# Patient Record
Sex: FEMALE | Race: WHITE
[De-identification: names, ages, dates, MRNs, and addresses within clinical notes are randomized per-mention and may not be internally consistent; named-entity substitution may affect disease eponyms.]

---

## 2018-11-14 ENCOUNTER — HOSPITAL ENCOUNTER (OUTPATIENT)
Dept: HOSPITAL 75 - LABNPT | Age: 38
End: 2018-11-14
Attending: OBSTETRICS & GYNECOLOGY
Payer: COMMERCIAL

## 2018-11-14 DIAGNOSIS — O28.8: Primary | ICD-10-CM

## 2018-11-14 LAB
CREAT UR-MCNC: 198 MG/DL (ref 30–125)
PROT UR-MCNC: 29 MG/DL (ref 6–12)

## 2018-11-14 PROCEDURE — 82570 ASSAY OF URINE CREATININE: CPT

## 2018-11-14 PROCEDURE — 84156 ASSAY OF PROTEIN URINE: CPT

## 2018-11-29 ENCOUNTER — HOSPITAL ENCOUNTER (OUTPATIENT)
Dept: HOSPITAL 75 - LABNPT | Age: 38
End: 2018-11-29
Attending: OBSTETRICS & GYNECOLOGY
Payer: COMMERCIAL

## 2018-11-29 DIAGNOSIS — O28.8: Primary | ICD-10-CM

## 2018-11-29 LAB
CREAT UR-MCNC: 195 MG/DL (ref 30–125)
PROT UR-MCNC: 25 MG/DL (ref 6–12)

## 2018-11-29 PROCEDURE — 84156 ASSAY OF PROTEIN URINE: CPT

## 2018-11-29 PROCEDURE — 82570 ASSAY OF URINE CREATININE: CPT

## 2018-12-06 ENCOUNTER — HOSPITAL ENCOUNTER (INPATIENT)
Dept: HOSPITAL 75 - LDRP | Age: 38
LOS: 3 days | Discharge: HOME | End: 2018-12-09
Attending: OBSTETRICS & GYNECOLOGY | Admitting: OBSTETRICS & GYNECOLOGY
Payer: COMMERCIAL

## 2018-12-06 VITALS — WEIGHT: 176.5 LBS | HEIGHT: 63 IN | BODY MASS INDEX: 31.27 KG/M2

## 2018-12-06 VITALS — SYSTOLIC BLOOD PRESSURE: 141 MMHG | DIASTOLIC BLOOD PRESSURE: 88 MMHG

## 2018-12-06 VITALS — SYSTOLIC BLOOD PRESSURE: 141 MMHG | DIASTOLIC BLOOD PRESSURE: 93 MMHG

## 2018-12-06 VITALS — SYSTOLIC BLOOD PRESSURE: 122 MMHG | DIASTOLIC BLOOD PRESSURE: 77 MMHG

## 2018-12-06 DIAGNOSIS — O99.820: ICD-10-CM

## 2018-12-06 DIAGNOSIS — O99.613: ICD-10-CM

## 2018-12-06 DIAGNOSIS — Z3A.36: ICD-10-CM

## 2018-12-06 DIAGNOSIS — K21.9: ICD-10-CM

## 2018-12-06 LAB
ALBUMIN SERPL-MCNC: 3.3 GM/DL (ref 3.2–4.5)
ALP SERPL-CCNC: 129 U/L (ref 40–136)
ALT SERPL-CCNC: 14 U/L (ref 0–55)
APTT PPP: YELLOW S
BACTERIA #/AREA URNS HPF: NEGATIVE /HPF
BASOPHILS # BLD AUTO: 0 10^3/UL (ref 0–0.1)
BASOPHILS NFR BLD AUTO: 0 % (ref 0–10)
BILIRUB SERPL-MCNC: 0.4 MG/DL (ref 0.1–1)
BILIRUB UR QL STRIP: NEGATIVE
BUN/CREAT SERPL: 7
CALCIUM SERPL-MCNC: 9.1 MG/DL (ref 8.5–10.1)
CHLORIDE SERPL-SCNC: 109 MMOL/L (ref 98–107)
CO2 SERPL-SCNC: 19 MMOL/L (ref 21–32)
CREAT SERPL-MCNC: 0.68 MG/DL (ref 0.6–1.3)
CREAT UR-MCNC: 73 MG/DL (ref 30–125)
EOSINOPHIL # BLD AUTO: 0.1 10^3/UL (ref 0–0.3)
EOSINOPHIL NFR BLD AUTO: 1 % (ref 0–10)
ERYTHROCYTE [DISTWIDTH] IN BLOOD BY AUTOMATED COUNT: 13.8 % (ref 10–14.5)
FIBRINOGEN PPP-MCNC: (no result) MG/DL
GFR SERPLBLD BASED ON 1.73 SQ M-ARVRAT: > 60 ML/MIN
GLUCOSE SERPL-MCNC: 69 MG/DL (ref 70–105)
GLUCOSE UR STRIP-MCNC: NEGATIVE MG/DL
HCT VFR BLD CALC: 31 % (ref 35–52)
HGB BLD-MCNC: 10 G/DL (ref 11.5–16)
KETONES UR QL STRIP: (no result)
LEUKOCYTE ESTERASE UR QL STRIP: NEGATIVE
LYMPHOCYTES # BLD AUTO: 1.6 X 10^3 (ref 1–4)
LYMPHOCYTES NFR BLD AUTO: 18 % (ref 12–44)
MANUAL DIFFERENTIAL PERFORMED BLD QL: NO
MCH RBC QN AUTO: 26 PG (ref 25–34)
MCHC RBC AUTO-ENTMCNC: 33 G/DL (ref 32–36)
MCV RBC AUTO: 79 FL (ref 80–99)
MONOCYTES # BLD AUTO: 0.8 X 10^3 (ref 0–1)
MONOCYTES NFR BLD AUTO: 9 % (ref 0–12)
NEUTROPHILS # BLD AUTO: 6.6 X 10^3 (ref 1.8–7.8)
NEUTROPHILS NFR BLD AUTO: 73 % (ref 42–75)
NITRITE UR QL STRIP: NEGATIVE
PH UR STRIP: 6 [PH] (ref 5–9)
PLATELET # BLD: 214 10^3/UL (ref 130–400)
PMV BLD AUTO: 12.8 FL (ref 7.4–10.4)
POTASSIUM SERPL-SCNC: 3.8 MMOL/L (ref 3.6–5)
PROT SERPL-MCNC: 6 GM/DL (ref 6.4–8.2)
PROT UR QL STRIP: (no result)
PROT UR-MCNC: 7 MG/DL (ref 6–12)
RBC # BLD AUTO: 3.86 10^6/UL (ref 4.35–5.85)
RBC #/AREA URNS HPF: (no result) /HPF
SODIUM SERPL-SCNC: 137 MMOL/L (ref 135–145)
SP GR UR STRIP: 1.01 (ref 1.02–1.02)
SQUAMOUS #/AREA URNS HPF: (no result) /HPF
URATE SERPL-MCNC: 4.6 MG/DL (ref 2.6–7.2)
UROBILINOGEN UR-MCNC: NORMAL MG/DL
WBC # BLD AUTO: 9 10^3/UL (ref 4.3–11)
WBC #/AREA URNS HPF: (no result) /HPF

## 2018-12-06 PROCEDURE — 84550 ASSAY OF BLOOD/URIC ACID: CPT

## 2018-12-06 PROCEDURE — 80053 COMPREHEN METABOLIC PANEL: CPT

## 2018-12-06 PROCEDURE — 84156 ASSAY OF PROTEIN URINE: CPT

## 2018-12-06 PROCEDURE — 82570 ASSAY OF URINE CREATININE: CPT

## 2018-12-06 PROCEDURE — 83615 LACTATE (LD) (LDH) ENZYME: CPT

## 2018-12-06 PROCEDURE — 81000 URINALYSIS NONAUTO W/SCOPE: CPT

## 2018-12-06 PROCEDURE — 85007 BL SMEAR W/DIFF WBC COUNT: CPT

## 2018-12-06 PROCEDURE — 86900 BLOOD TYPING SEROLOGIC ABO: CPT

## 2018-12-06 PROCEDURE — 85027 COMPLETE CBC AUTOMATED: CPT

## 2018-12-06 PROCEDURE — 86901 BLOOD TYPING SEROLOGIC RH(D): CPT

## 2018-12-06 PROCEDURE — 85025 COMPLETE CBC W/AUTO DIFF WBC: CPT

## 2018-12-06 PROCEDURE — 86850 RBC ANTIBODY SCREEN: CPT

## 2018-12-06 PROCEDURE — 36415 COLL VENOUS BLD VENIPUNCTURE: CPT

## 2018-12-06 RX ADMIN — SODIUM CHLORIDE, SODIUM LACTATE, POTASSIUM CHLORIDE, CALCIUM CHLORIDE, AND DEXTROSE MONOHYDRATE SCH MLS/HR: 600; 310; 30; 20; 5 INJECTION, SOLUTION INTRAVENOUS at 12:39

## 2018-12-06 RX ADMIN — WATER SCH MLS/HR: 1 INJECTION INTRAMUSCULAR; INTRAVENOUS; SUBCUTANEOUS at 17:59

## 2018-12-06 RX ADMIN — WATER SCH MLS/HR: 1 INJECTION INTRAMUSCULAR; INTRAVENOUS; SUBCUTANEOUS at 22:00

## 2018-12-06 RX ADMIN — SODIUM CHLORIDE, SODIUM LACTATE, POTASSIUM CHLORIDE, CALCIUM CHLORIDE, AND DEXTROSE MONOHYDRATE SCH MLS/HR: 600; 310; 30; 20; 5 INJECTION, SOLUTION INTRAVENOUS at 17:11

## 2018-12-06 RX ADMIN — SODIUM CHLORIDE, SODIUM LACTATE, POTASSIUM CHLORIDE, CALCIUM CHLORIDE, AND DEXTROSE MONOHYDRATE SCH MLS/HR: 600; 310; 30; 20; 5 INJECTION, SOLUTION INTRAVENOUS at 10:40

## 2018-12-06 RX ADMIN — BETAMETHASONE ACETATE AND BETAMETHASONE SODIUM PHOSPHATE SCH MG: 3; 3 INJECTION, SUSPENSION INTRA-ARTICULAR; INTRALESIONAL; INTRAMUSCULAR; SOFT TISSUE at 13:30

## 2018-12-06 NOTE — HISTORY & PHYSICAL
History and Physical


Date Seen by Provider:  Dec 6, 2018


Time Seen by Provider:  17:18


This patient is a 38-year-old G 3 P2 female currently at 36-4/7 weeks' 

gestation.  She was seen on this date and found to be having regular 

contractions.  Her GBS culture after 35 weeks gestation was positive.  Labor 

and delivery for evaluation and hydration.  In spite of hydration her 

contractions have persisted and seemed to have intensified she has demonstrated 

cervical change from 2 and thick in the clinic to almost 3 and very soft 

multiparous cervix.  The presenting part has dropped from the -3 station to -1.

  Membranes are intact.





Allergies are none





Medications are prenatal vitamins





Medical social and surgical histories are per the antepartum record





Lab work is as follows


Laboratory Tests








Test


 18


10:35 18


12:00 18


12:20 Range/Units


 


 


White Blood Count


 9.0 


 


 


 4.3-11.0


10^3/uL


 


Red Blood Count


 3.86 L


 


 


 4.35-5.85


10^6/uL


 


Hemoglobin 10.0 L   11.5-16.0  G/DL


 


Hematocrit 31 L   35-52  %


 


Mean Corpuscular Volume 79 L   80-99  FL


 


Mean Corpuscular Hemoglobin 26    25-34  PG


 


Mean Corpuscular Hemoglobin


Concent 33 


 


 


 32-36  G/DL





 


Red Cell Distribution Width 13.8    10.0-14.5  %


 


Platelet Count


 214 


 


 


 130-400


10^3/uL


 


Mean Platelet Volume 12.8 H   7.4-10.4  FL


 


Neutrophils (%) (Auto) 73    42-75  %


 


Lymphocytes (%) (Auto) 18    12-44  %


 


Monocytes (%) (Auto) 9    0-12  %


 


Eosinophils (%) (Auto) 1    0-10  %


 


Basophils (%) (Auto) 0    0-10  %


 


Neutrophils # (Auto) 6.6    1.8-7.8  X 10^3


 


Lymphocytes # (Auto) 1.6    1.0-4.0  X 10^3


 


Monocytes # (Auto) 0.8    0.0-1.0  X 10^3


 


Eosinophils # (Auto)


 0.1 


 


 


 0.0-0.3


10^3/uL


 


Basophils # (Auto)


 0.0 


 


 


 0.0-0.1


10^3/uL


 


Sodium Level 137    135-145  MMOL/L


 


Potassium Level 3.8    3.6-5.0  MMOL/L


 


Chloride Level 109 H     MMOL/L


 


Carbon Dioxide Level 19 L   21-32  MMOL/L


 


Anion Gap 9    5-14  MMOL/L


 


Blood Urea Nitrogen 5 L   7-18  MG/DL


 


Creatinine


 0.68 


 


 


 0.60-1.30


MG/DL


 


Estimat Glomerular Filtration


Rate > 60 


 


 


  





 


BUN/Creatinine Ratio 7     


 


Glucose Level 69 L     MG/DL


 


Uric Acid 4.6    2.6-7.2  MG/DL


 


Calcium Level 9.1    8.5-10.1  MG/DL


 


Corrected Calcium 9.7    8.5-10.1  MG/DL


 


Total Bilirubin 0.4    0.1-1.0  MG/DL


 


Aspartate Amino Transf


(AST/SGOT) 19 


 


 


 5-34  U/L





 


Alanine Aminotransferase


(ALT/SGPT) 14 


 


 


 0-55  U/L





 


Alkaline Phosphatase 129      U/L


 


Lactate Dehydrogenase 216    125-220  U/L


 


Total Protein 6.0 L   6.4-8.2  GM/DL


 


Albumin 3.3    3.2-4.5  GM/DL


 


Urine Color  YELLOW    


 


Urine Clarity  VERY CLOUDY H   


 


Urine pH  6   5-9  


 


Urine Specific Gravity  1.015 L  1.016-1.022  


 


Urine Protein  1+ H 7  6-12  MG/DL


 


Urine Glucose (UA)  NEGATIVE   NEGATIVE  


 


Urine Ketones  2+ H  NEGATIVE  


 


Urine Nitrite  NEGATIVE   NEGATIVE  


 


Urine Bilirubin  NEGATIVE   NEGATIVE  


 


Urine Urobilinogen  NORMAL   NORMAL  MG/DL


 


Urine Leukocyte Esterase  NEGATIVE   NEGATIVE  


 


Urine RBC (Auto)  NEGATIVE   NEGATIVE  


 


Urine RBC  RARE    /HPF


 


Urine WBC  NONE    /HPF


 


Urine Squamous Epithelial


Cells 


 5-10 


 


  /HPF





 


Urine Crystals  NONE    /LPF


 


Urine Bacteria  NEGATIVE    /HPF


 


Urine Casts  NONE    /LPF


 


Urine Mucus  NEGATIVE    /LPF


 


Urine Culture Indicated  NO    


 


Urine Creatinine   73    MG/DL


 


Urine Protein/Creatinine Ratio   0.10   








Fetal monitor shows contractions every 2-3 minutes.  These contractions lasting 

in excess of a minute.  Fetal heart rate pattern is normal and reassuring





HEENT exam is normal


Neck is supple no lymphadenopathy no thyromegaly


Abdomen is gravid soft nontender nondistended


Extremities show clubbing cyanosis.  There is no Homans sign.





Pelvic exam is as noted above.





Assessment and plan   36 and 47 weeks gestation with  labor.  Patient 

has been started on ampicillin for GBS prophylaxis.  As she is less than 37 

weeks she is also been given a single dose of betamethasone.  She has been 

hydrated now with well over a liter of fluid to the point that she is voiding 

frequently.





Plan is for continued observation with IV fluids IV antibiotics and no plan for 

tocolyse's if she shows continued progression in labor.  Patient understands 

the plan and agrees.


36-4/7 weeks with  labor





Allergies and Home Medications


Allergies


Coded Allergies:  


     No Known Drug Allergies (Unverified , 18)





Home Medications


Omeprazole Magnesium 20 Mg Tablet., 20 MG PO DAILY, (Reported)





Patient Home Medication List


Home Medication List Reviewed:  Yes











FERNANDA WALTER MD Dec 6, 2018 17:23

## 2018-12-07 VITALS — DIASTOLIC BLOOD PRESSURE: 84 MMHG | SYSTOLIC BLOOD PRESSURE: 138 MMHG

## 2018-12-07 VITALS — DIASTOLIC BLOOD PRESSURE: 87 MMHG | SYSTOLIC BLOOD PRESSURE: 132 MMHG

## 2018-12-07 VITALS — DIASTOLIC BLOOD PRESSURE: 80 MMHG | SYSTOLIC BLOOD PRESSURE: 128 MMHG

## 2018-12-07 VITALS — SYSTOLIC BLOOD PRESSURE: 136 MMHG | DIASTOLIC BLOOD PRESSURE: 74 MMHG

## 2018-12-07 VITALS — SYSTOLIC BLOOD PRESSURE: 143 MMHG | DIASTOLIC BLOOD PRESSURE: 87 MMHG

## 2018-12-07 VITALS — SYSTOLIC BLOOD PRESSURE: 121 MMHG | DIASTOLIC BLOOD PRESSURE: 75 MMHG

## 2018-12-07 VITALS — DIASTOLIC BLOOD PRESSURE: 73 MMHG | SYSTOLIC BLOOD PRESSURE: 137 MMHG

## 2018-12-07 VITALS — SYSTOLIC BLOOD PRESSURE: 120 MMHG | DIASTOLIC BLOOD PRESSURE: 63 MMHG

## 2018-12-07 VITALS — SYSTOLIC BLOOD PRESSURE: 129 MMHG | DIASTOLIC BLOOD PRESSURE: 81 MMHG

## 2018-12-07 VITALS — DIASTOLIC BLOOD PRESSURE: 74 MMHG | SYSTOLIC BLOOD PRESSURE: 117 MMHG

## 2018-12-07 VITALS — SYSTOLIC BLOOD PRESSURE: 131 MMHG | DIASTOLIC BLOOD PRESSURE: 79 MMHG

## 2018-12-07 VITALS — DIASTOLIC BLOOD PRESSURE: 79 MMHG | SYSTOLIC BLOOD PRESSURE: 126 MMHG

## 2018-12-07 VITALS — DIASTOLIC BLOOD PRESSURE: 77 MMHG | SYSTOLIC BLOOD PRESSURE: 154 MMHG

## 2018-12-07 VITALS — SYSTOLIC BLOOD PRESSURE: 111 MMHG | DIASTOLIC BLOOD PRESSURE: 59 MMHG

## 2018-12-07 VITALS — DIASTOLIC BLOOD PRESSURE: 70 MMHG | SYSTOLIC BLOOD PRESSURE: 129 MMHG

## 2018-12-07 VITALS — SYSTOLIC BLOOD PRESSURE: 127 MMHG | DIASTOLIC BLOOD PRESSURE: 65 MMHG

## 2018-12-07 VITALS — SYSTOLIC BLOOD PRESSURE: 122 MMHG | DIASTOLIC BLOOD PRESSURE: 76 MMHG

## 2018-12-07 VITALS — DIASTOLIC BLOOD PRESSURE: 88 MMHG | SYSTOLIC BLOOD PRESSURE: 144 MMHG

## 2018-12-07 VITALS — DIASTOLIC BLOOD PRESSURE: 76 MMHG | SYSTOLIC BLOOD PRESSURE: 132 MMHG

## 2018-12-07 VITALS — DIASTOLIC BLOOD PRESSURE: 78 MMHG | SYSTOLIC BLOOD PRESSURE: 120 MMHG

## 2018-12-07 VITALS — SYSTOLIC BLOOD PRESSURE: 92 MMHG | DIASTOLIC BLOOD PRESSURE: 50 MMHG

## 2018-12-07 VITALS — SYSTOLIC BLOOD PRESSURE: 101 MMHG | DIASTOLIC BLOOD PRESSURE: 55 MMHG

## 2018-12-07 VITALS — SYSTOLIC BLOOD PRESSURE: 122 MMHG | DIASTOLIC BLOOD PRESSURE: 74 MMHG

## 2018-12-07 VITALS — SYSTOLIC BLOOD PRESSURE: 124 MMHG | DIASTOLIC BLOOD PRESSURE: 70 MMHG

## 2018-12-07 VITALS — SYSTOLIC BLOOD PRESSURE: 122 MMHG | DIASTOLIC BLOOD PRESSURE: 75 MMHG

## 2018-12-07 VITALS — DIASTOLIC BLOOD PRESSURE: 74 MMHG | SYSTOLIC BLOOD PRESSURE: 116 MMHG

## 2018-12-07 VITALS — SYSTOLIC BLOOD PRESSURE: 167 MMHG | DIASTOLIC BLOOD PRESSURE: 82 MMHG

## 2018-12-07 VITALS — SYSTOLIC BLOOD PRESSURE: 128 MMHG | DIASTOLIC BLOOD PRESSURE: 68 MMHG

## 2018-12-07 VITALS — DIASTOLIC BLOOD PRESSURE: 59 MMHG | SYSTOLIC BLOOD PRESSURE: 128 MMHG

## 2018-12-07 VITALS — SYSTOLIC BLOOD PRESSURE: 117 MMHG | DIASTOLIC BLOOD PRESSURE: 66 MMHG

## 2018-12-07 VITALS — SYSTOLIC BLOOD PRESSURE: 126 MMHG | DIASTOLIC BLOOD PRESSURE: 77 MMHG

## 2018-12-07 VITALS — SYSTOLIC BLOOD PRESSURE: 120 MMHG | DIASTOLIC BLOOD PRESSURE: 74 MMHG

## 2018-12-07 VITALS — SYSTOLIC BLOOD PRESSURE: 117 MMHG | DIASTOLIC BLOOD PRESSURE: 69 MMHG

## 2018-12-07 VITALS — DIASTOLIC BLOOD PRESSURE: 80 MMHG | SYSTOLIC BLOOD PRESSURE: 118 MMHG

## 2018-12-07 VITALS — DIASTOLIC BLOOD PRESSURE: 73 MMHG | SYSTOLIC BLOOD PRESSURE: 124 MMHG

## 2018-12-07 VITALS — DIASTOLIC BLOOD PRESSURE: 90 MMHG | SYSTOLIC BLOOD PRESSURE: 134 MMHG

## 2018-12-07 VITALS — DIASTOLIC BLOOD PRESSURE: 74 MMHG | SYSTOLIC BLOOD PRESSURE: 118 MMHG

## 2018-12-07 VITALS — SYSTOLIC BLOOD PRESSURE: 137 MMHG | DIASTOLIC BLOOD PRESSURE: 62 MMHG

## 2018-12-07 VITALS — SYSTOLIC BLOOD PRESSURE: 121 MMHG | DIASTOLIC BLOOD PRESSURE: 77 MMHG

## 2018-12-07 VITALS — SYSTOLIC BLOOD PRESSURE: 123 MMHG | DIASTOLIC BLOOD PRESSURE: 75 MMHG

## 2018-12-07 VITALS — SYSTOLIC BLOOD PRESSURE: 124 MMHG | DIASTOLIC BLOOD PRESSURE: 69 MMHG

## 2018-12-07 VITALS — DIASTOLIC BLOOD PRESSURE: 98 MMHG | SYSTOLIC BLOOD PRESSURE: 149 MMHG

## 2018-12-07 VITALS — SYSTOLIC BLOOD PRESSURE: 85 MMHG | DIASTOLIC BLOOD PRESSURE: 47 MMHG

## 2018-12-07 VITALS — SYSTOLIC BLOOD PRESSURE: 130 MMHG | DIASTOLIC BLOOD PRESSURE: 72 MMHG

## 2018-12-07 VITALS — SYSTOLIC BLOOD PRESSURE: 107 MMHG | DIASTOLIC BLOOD PRESSURE: 57 MMHG

## 2018-12-07 VITALS — DIASTOLIC BLOOD PRESSURE: 74 MMHG | SYSTOLIC BLOOD PRESSURE: 127 MMHG

## 2018-12-07 VITALS — DIASTOLIC BLOOD PRESSURE: 77 MMHG | SYSTOLIC BLOOD PRESSURE: 124 MMHG

## 2018-12-07 VITALS — DIASTOLIC BLOOD PRESSURE: 64 MMHG | SYSTOLIC BLOOD PRESSURE: 138 MMHG

## 2018-12-07 VITALS — DIASTOLIC BLOOD PRESSURE: 74 MMHG | SYSTOLIC BLOOD PRESSURE: 121 MMHG

## 2018-12-07 VITALS — DIASTOLIC BLOOD PRESSURE: 76 MMHG | SYSTOLIC BLOOD PRESSURE: 127 MMHG

## 2018-12-07 VITALS — DIASTOLIC BLOOD PRESSURE: 67 MMHG | SYSTOLIC BLOOD PRESSURE: 135 MMHG

## 2018-12-07 VITALS — SYSTOLIC BLOOD PRESSURE: 133 MMHG | DIASTOLIC BLOOD PRESSURE: 81 MMHG

## 2018-12-07 VITALS — SYSTOLIC BLOOD PRESSURE: 124 MMHG | DIASTOLIC BLOOD PRESSURE: 73 MMHG

## 2018-12-07 VITALS — DIASTOLIC BLOOD PRESSURE: 63 MMHG | SYSTOLIC BLOOD PRESSURE: 134 MMHG

## 2018-12-07 VITALS — SYSTOLIC BLOOD PRESSURE: 115 MMHG | DIASTOLIC BLOOD PRESSURE: 76 MMHG

## 2018-12-07 VITALS — SYSTOLIC BLOOD PRESSURE: 154 MMHG | DIASTOLIC BLOOD PRESSURE: 81 MMHG

## 2018-12-07 VITALS — DIASTOLIC BLOOD PRESSURE: 72 MMHG | SYSTOLIC BLOOD PRESSURE: 125 MMHG

## 2018-12-07 VITALS — DIASTOLIC BLOOD PRESSURE: 57 MMHG | SYSTOLIC BLOOD PRESSURE: 110 MMHG

## 2018-12-07 VITALS — SYSTOLIC BLOOD PRESSURE: 120 MMHG | DIASTOLIC BLOOD PRESSURE: 77 MMHG

## 2018-12-07 VITALS — DIASTOLIC BLOOD PRESSURE: 67 MMHG | SYSTOLIC BLOOD PRESSURE: 127 MMHG

## 2018-12-07 VITALS — DIASTOLIC BLOOD PRESSURE: 56 MMHG | SYSTOLIC BLOOD PRESSURE: 105 MMHG

## 2018-12-07 VITALS — DIASTOLIC BLOOD PRESSURE: 77 MMHG | SYSTOLIC BLOOD PRESSURE: 142 MMHG

## 2018-12-07 VITALS — SYSTOLIC BLOOD PRESSURE: 116 MMHG | DIASTOLIC BLOOD PRESSURE: 76 MMHG

## 2018-12-07 LAB
BASOPHILS # BLD AUTO: 0 10^3/UL (ref 0–0.1)
BASOPHILS NFR BLD AUTO: 0 % (ref 0–10)
BASOPHILS NFR BLD MANUAL: 0 %
EOSINOPHIL # BLD AUTO: 0 10^3/UL (ref 0–0.3)
EOSINOPHIL NFR BLD AUTO: 0 % (ref 0–10)
EOSINOPHIL NFR BLD MANUAL: 0 %
ERYTHROCYTE [DISTWIDTH] IN BLOOD BY AUTOMATED COUNT: 13.8 % (ref 10–14.5)
HCT VFR BLD CALC: 30 % (ref 35–52)
HGB BLD-MCNC: 9.5 G/DL (ref 11.5–16)
LYMPHOCYTES # BLD AUTO: 1 X 10^3 (ref 1–4)
LYMPHOCYTES NFR BLD AUTO: 7 % (ref 12–44)
MANUAL DIFFERENTIAL PERFORMED BLD QL: YES
MCH RBC QN AUTO: 25 PG (ref 25–34)
MCHC RBC AUTO-ENTMCNC: 32 G/DL (ref 32–36)
MCV RBC AUTO: 80 FL (ref 80–99)
MONOCYTES # BLD AUTO: 0.6 X 10^3 (ref 0–1)
MONOCYTES NFR BLD AUTO: 4 % (ref 0–12)
MONOCYTES NFR BLD: 6 %
NEUTROPHILS # BLD AUTO: 13.3 X 10^3 (ref 1.8–7.8)
NEUTROPHILS NFR BLD AUTO: 89 % (ref 42–75)
NEUTS BAND NFR BLD MANUAL: 85 %
NEUTS BAND NFR BLD: 0 %
PLATELET # BLD: 225 10^3/UL (ref 130–400)
PMV BLD AUTO: 12.5 FL (ref 7.4–10.4)
RBC # BLD AUTO: 3.76 10^6/UL (ref 4.35–5.85)
RBC MORPH BLD: NORMAL
VARIANT LYMPHS NFR BLD MANUAL: 9 %
WBC # BLD AUTO: 14.9 10^3/UL (ref 4.3–11)

## 2018-12-07 RX ADMIN — WATER SCH MLS/HR: 1 INJECTION INTRAMUSCULAR; INTRAVENOUS; SUBCUTANEOUS at 02:00

## 2018-12-07 RX ADMIN — KETOROLAC TROMETHAMINE SCH MG: 30 INJECTION, SOLUTION INTRAMUSCULAR; INTRAVENOUS at 22:59

## 2018-12-07 RX ADMIN — BETAMETHASONE ACETATE AND BETAMETHASONE SODIUM PHOSPHATE SCH MG: 3; 3 INJECTION, SUSPENSION INTRA-ARTICULAR; INTRALESIONAL; INTRAMUSCULAR; SOFT TISSUE at 12:43

## 2018-12-07 RX ADMIN — WATER SCH MLS/HR: 1 INJECTION INTRAMUSCULAR; INTRAVENOUS; SUBCUTANEOUS at 12:41

## 2018-12-07 RX ADMIN — SODIUM CHLORIDE, SODIUM LACTATE, POTASSIUM CHLORIDE, CALCIUM CHLORIDE, AND DEXTROSE MONOHYDRATE SCH MLS/HR: 600; 310; 30; 20; 5 INJECTION, SOLUTION INTRAVENOUS at 13:42

## 2018-12-07 RX ADMIN — SODIUM CHLORIDE, SODIUM LACTATE, POTASSIUM CHLORIDE, CALCIUM CHLORIDE, AND DEXTROSE MONOHYDRATE SCH MLS/HR: 600; 310; 30; 20; 5 INJECTION, SOLUTION INTRAVENOUS at 03:18

## 2018-12-07 RX ADMIN — KETOROLAC TROMETHAMINE SCH MG: 30 INJECTION, SOLUTION INTRAMUSCULAR; INTRAVENOUS at 17:48

## 2018-12-07 RX ADMIN — DOCUSATE SODIUM SCH MG: 100 CAPSULE ORAL at 20:50

## 2018-12-07 RX ADMIN — WATER SCH MLS/HR: 1 INJECTION INTRAMUSCULAR; INTRAVENOUS; SUBCUTANEOUS at 06:16

## 2018-12-07 RX ADMIN — SODIUM CHLORIDE, SODIUM LACTATE, POTASSIUM CHLORIDE, CALCIUM CHLORIDE, AND DEXTROSE MONOHYDRATE SCH MLS/HR: 600; 310; 30; 20; 5 INJECTION, SOLUTION INTRAVENOUS at 10:17

## 2018-12-07 NOTE — PROGRESS NOTE-STANDARD
Standard Progress Note


Progress Notes/Assess & Plan


Date Seen by a Provider:  Dec 7, 2018


Time Seen by a Provider:  07:04


Progress/Assessment & Plan


Patient complains of persistent contractions and pressure.  She denies rupture 

membranes or bleeding.  She denies headache.  She does complain of increasing 

swelling and edema patient feels baby moving.  Patient denies nausea vomiting.








Vital Signs








  Date Time  Temp Pulse Resp B/P (MAP) Pulse Ox O2 Delivery O2 Flow Rate FiO2


 


18 03:19 99.0 120 18 118/80 (93)  Room Air  


 


18 00:45  105 18  99 Room Air  


 


18 00:01 98.5 86 18 124/77 (93)  Room Air  


 


18 20:14 99.6 99 18 141/88 (105)  Room Air  


 


18 14:21  81 18 122/77 (92)  Room Air  


 


18 10:26 99.0 99 18 141/93 (109)  Room Air  














I & O 


 


 18





 07:00


 


Intake Total 2150 ml


 


Balance 2150 ml





Vital signs are stable.  Blood pressures have been somewhat labile but 

acceptable.  Patient is afebrile.





The abdomen is gravid and nontender.


Extremities show no clubbing or cyanosis.  There is fairly significant pedal 

edema.  DTRs are 3+ to 4 over 4 globally


Pelvic exam is pending





Fetal monitor shows frequent accelerations with better than average beat-to-

beat variability.  Patient has had 2-3 episodes of fetal bradycardia.  

Contractions have varied from every 2-3 minutes to now every 5-8 minutes.





Laboratory Tests


18 10:35








18 05:45








Assessment and plan   36 and now 5/7 weeks' gestation with  labor a GBS 

positive culture suspicious fetal heart rate tracing suspicion for a LGA baby.  

Plan is for amniotomy and Pitocin augmentation of labor.  We will continue the 

antibiotics until delivery.  Patient will be allowed an epidural.  Patient 

understands that there is increased potential for need for  delivery.











FERNANDA WALTER MD Dec 7, 2018 07:08

## 2018-12-08 VITALS — DIASTOLIC BLOOD PRESSURE: 70 MMHG | SYSTOLIC BLOOD PRESSURE: 121 MMHG

## 2018-12-08 VITALS — DIASTOLIC BLOOD PRESSURE: 84 MMHG | SYSTOLIC BLOOD PRESSURE: 137 MMHG

## 2018-12-08 VITALS — SYSTOLIC BLOOD PRESSURE: 110 MMHG | DIASTOLIC BLOOD PRESSURE: 65 MMHG

## 2018-12-08 VITALS — DIASTOLIC BLOOD PRESSURE: 67 MMHG | SYSTOLIC BLOOD PRESSURE: 124 MMHG

## 2018-12-08 VITALS — SYSTOLIC BLOOD PRESSURE: 137 MMHG | DIASTOLIC BLOOD PRESSURE: 81 MMHG

## 2018-12-08 VITALS — SYSTOLIC BLOOD PRESSURE: 132 MMHG | DIASTOLIC BLOOD PRESSURE: 75 MMHG

## 2018-12-08 RX ADMIN — DOCUSATE SODIUM SCH MG: 100 CAPSULE ORAL at 20:11

## 2018-12-08 RX ADMIN — KETOROLAC TROMETHAMINE SCH MG: 30 INJECTION, SOLUTION INTRAMUSCULAR; INTRAVENOUS at 11:45

## 2018-12-08 RX ADMIN — DOCUSATE SODIUM SCH MG: 100 CAPSULE ORAL at 09:37

## 2018-12-08 RX ADMIN — IBUPROFEN SCH MG: 800 TABLET, FILM COATED ORAL at 12:24

## 2018-12-08 RX ADMIN — IBUPROFEN SCH MG: 800 TABLET, FILM COATED ORAL at 23:58

## 2018-12-08 RX ADMIN — IBUPROFEN SCH MG: 800 TABLET, FILM COATED ORAL at 18:26

## 2018-12-08 RX ADMIN — IBUPROFEN SCH MG: 800 TABLET, FILM COATED ORAL at 06:56

## 2018-12-08 NOTE — PROGRESS NOTE-STANDARD
Standard Progress Note


Progress Notes/Assess & Plan


Date Seen by a Provider:  Dec 8, 2018


Time Seen by a Provider:  08:34


Progress/Assessment & Plan


Patient complains of persistent contractions and pressure.  She denies rupture 

membranes or bleeding.  She denies headache.  She does complain of increasing 

swelling and edema patient feels baby moving.  Patient denies nausea vomiting.








Vital Signs








  Date Time  Temp Pulse Resp B/P (MAP) Pulse Ox O2 Delivery O2 Flow Rate FiO2


 


18 03:19 99.0 120 18 118/80 (93)  Room Air  


 


18 00:45  105 18  99 Room Air  


 


18 00:01 98.5 86 18 124/77 (93)  Room Air  


 


18 20:14 99.6 99 18 141/88 (105)  Room Air  


 


18 14:21  81 18 122/77 (92)  Room Air  


 


18 10:26 99.0 99 18 141/93 (109)  Room Air  














I & O 


 


 18





 07:00


 


Intake Total 2150 ml


 


Balance 2150 ml





Vital signs are stable.  Blood pressures have been somewhat labile but 

acceptable.  Patient is afebrile.





The abdomen is gravid and nontender.


Extremities show no clubbing or cyanosis.  There is fairly significant pedal 

edema.  DTRs are 3+ to 4 over 4 globally


Pelvic exam is pending





Fetal monitor shows frequent accelerations with better than average beat-to-

beat variability.  Patient has had 2-3 episodes of fetal bradycardia.  

Contractions have varied from every 2-3 minutes to now every 5-8 minutes.





Laboratory Tests


18 10:35








18 05:45








Assessment and plan   36 and now 5/7 weeks' gestation with  labor a GBS 

positive culture suspicious fetal heart rate tracing suspicion for a LGA baby.  

Plan is for amniotomy and Pitocin augmentation of labor.  We will continue the 

antibiotics until delivery.  Patient will be allowed an epidural.  Patient 

understands that there is increased potential for need for  delivery.





2018





This patient is without complaint.  She is ambulating, voiding, tolerating oral 

intake well, has good pain control.  Patient denies chest pain, denies 

shortness of breath, denies nausea vomiting, and denies headache








Vital Signs








 18





 15:45 04:50


 


Temp  97.2


 


Pulse  87


 


Resp  18


 


B/P (MAP)  110/65 (80)


 


Pulse Ox  98


 


O2 Delivery  Room Air


 


O2 Flow Rate 15.00 





Vital signs are stable.  Patient is afebrile.





Fundus is firm below the umbilicus and nontender.


Extremities show clubbing cyanosis.  There is no Homans sign.





Assessment and plan   post partum day number 1 status post  spontaneous 

vaginal delivery doing well.  Plan is for routine convalescence care today and 

likely discharge home tomorrow


Final Diagnosis


 spontaneous vaginal delivery











FERNANDA WALTER MD Dec 8, 2018 08:35

## 2018-12-08 NOTE — ANESTHESIA-REGIONAL POST-OP
Regional


Patient Condition


Mental Status:  Alert, Oriented x3


Circulation:  Same as Pre-Op


Headache:  Absent


Sensation:  Full Recovery


Motor Block:  Absent





Post Op Complications


Complications


None





Follow Up Care/Instructions


Patient Instructions


None needed.





Anesthesia/Patient Condition


Patient is doing well, no complaints, stable vital signs, no apparent adverse 

anesthesia problems.   


No complications reported per nursing.


D/C home [facility] [place] [facility]:  Yes











STEVEN HESTER CRNA Dec 8, 2018 12:53

## 2018-12-08 NOTE — DISCHARGE INSTRUCTIONS
Discharge Instructions


Discharge Medications


New, Converted or Re-Newed RX:  RX on Chart





Patient Instructions


Patient Instructions:  


As directed


Return to The Hospital For:  


as directed





Activity & Diet


Discharge Diet:  No Restrictions


Activity as Tolerated:  No





Orders-Post D/C & Referrals





Follow Up Appt:


Call to make follow up appt. for patient in 4 weeks.





Activity 


Per routine post vaginal delivery instructions.





Diet as tolerated





Patient may shower or tub bathe as desired.











FERNANDA WALTER MD Dec 8, 2018 08:38

## 2018-12-08 NOTE — OPERATIVE REPORT
DATE OF SERVICE:  12/07/2018



DELIVERY NOTE



The patient delivered by term operative vaginal delivery, a viable female infant

with Apgars of 8 and 9 at 1 and 5 minutes respectively, weight of 7 pounds 4

ounces, birth time of 1548 and a cord blood pH 7.08.  The infant was

delivered over midline episiotomy under epidural analgesia.  Evans forceps

were applied at the +2 to +3 station secondary to decreased fetal heart rate to

the 60s and 70s with the contractions.  After correct placement was confirmed, 
gentle traction along the pelvic axis with the next 2 to 3 pushes delivered the 
vertex onto

the perineum.  Forceps were removed and maternal expulsive effort completed the

delivery. Episiotomy was employed to shorten the second stage and to allow

for adequate room as the introitus was quite narrow compared to the presenting

part.  The infant was bulb suctioned on delivery of the head and again on

completion of delivery.  Umbilical cord was doubly clamped, father cut the cord,

and baby was passed to mom's abdomen.



The cervix, vagina, rectum and perineum were examined after the placenta

delivered spontaneously Dimas.  It was a normal placenta with a 3-vessel cord.

It was sent to pathology for permanent section.



There was a midline episiotomy only.  The cervix and vagina, rectum and perineum

were otherwise intact.  The episiotomy was repaired with a single suture of 3-0

Vicryl Rapide in the usual manner without difficulty to good reapproximation and

good hemostasis.



Sponge and needle counts were correct on completion of delivery and repair. 

Estimated blood loss was around 200 mL.  The patient tolerated the delivery well

and remained in the LDR for recovery.  The baby remained with the mom.





Job ID: [id number]

DocumentID: [id number]

Dictated Date:  12/07/2018 16:07:53

Transcription Date: 12/08/2018 01:50:21

Dictated By: FERNANDA WALTER MD

MTDD

## 2018-12-09 VITALS — DIASTOLIC BLOOD PRESSURE: 78 MMHG | SYSTOLIC BLOOD PRESSURE: 134 MMHG

## 2018-12-09 VITALS — SYSTOLIC BLOOD PRESSURE: 148 MMHG | DIASTOLIC BLOOD PRESSURE: 86 MMHG

## 2018-12-09 VITALS — DIASTOLIC BLOOD PRESSURE: 74 MMHG | SYSTOLIC BLOOD PRESSURE: 121 MMHG

## 2018-12-09 RX ADMIN — DOCUSATE SODIUM SCH MG: 100 CAPSULE ORAL at 09:10

## 2018-12-09 RX ADMIN — IBUPROFEN SCH MG: 800 TABLET, FILM COATED ORAL at 09:11

## 2018-12-09 NOTE — DISCHARGE SUMMARY
Discharge Summary


36-5/7 week operative vaginal delivery


This patient is a 38-year-old white female who was admitted from my clinic on 

 secondary to  labor and elevated blood 

pressure.  Her history is significant for GBS positive culture.  She was 

started on ampicillin for GBS prophylaxis.  She was observed through the 

evening and through the night of 2018.  She continued to contract 

and showed gradual cervical change.  On 2018 amniotomy was 

performed when she was 5 cm dilated.  She continued to contract in labor and 

eventually became complete.  There have been couple episodes of decreased fetal 

heart rate during the period of labor that has always resolved.  As she was 

pushing baby's heart rate was dropped into the 60s.  When that became 

persistent and the presenting part was at +2 to +3 station Evans forceps were 

applied to facilitate the delivery.  Episiotomy was performed as well to 

shorten the second stage.  Delivery occurred promptly.  The patient has 

recovered uneventfully.





On 2018 which was post partum day number 1, she was ambulating, 

voiding, tolerating oral intake well had good pain control.  She had routine 

convalescence care through the day.





Now on 2018/postpartum day 2 patient again is ambulating well 

voiding well tolerating oral intake well has good pain control her vital signs 

are stable she is afebrile she feels up to discharge home and is determined she 

can be discharged home.





Principal diagnoses this hospitalization is  operative vaginal delivery





Secondary diagnoses are  labor, PIH, GBS positive culture, nonreassuring 

fetal heart rate pattern





Operations and procedures include IV fluids, IV antibiotics, amniotomy, Pitocin 

augmentation of labor, epidural analgesia, operative vaginal delivery using 

Evans forceps.





Patient was given appropriate discharge instructions.





Discharge medications are Percocet and Motrin and Colace.





Clinical Quality Measures


DVT/VTE Risk/Contraindication:


Risk Factor Score Per Nursin


RFS Level Per Nursing on Admit:  2=Moderate











FERNANDA WALTER MD Dec 9, 2018 09:10

## 2018-12-09 NOTE — PROGRESS NOTE-STANDARD
Standard Progress Note


Progress Notes/Assess & Plan


Date Seen by a Provider:  Dec 9, 2018


Time Seen by a Provider:  09:10


Progress/Assessment & Plan


Patient complains of persistent contractions and pressure.  She denies rupture 

membranes or bleeding.  She denies headache.  She does complain of increasing 

swelling and edema patient feels baby moving.  Patient denies nausea vomiting.








Vital Signs








  Date Time  Temp Pulse Resp B/P (MAP) Pulse Ox O2 Delivery O2 Flow Rate FiO2


 


18 03:19 99.0 120 18 118/80 (93)  Room Air  


 


18 00:45  105 18  99 Room Air  


 


18 00:01 98.5 86 18 124/77 (93)  Room Air  


 


18 20:14 99.6 99 18 141/88 (105)  Room Air  


 


18 14:21  81 18 122/77 (92)  Room Air  


 


18 10:26 99.0 99 18 141/93 (109)  Room Air  














I & O 


 


 18





 07:00


 


Intake Total 2150 ml


 


Balance 2150 ml





Vital signs are stable.  Blood pressures have been somewhat labile but 

acceptable.  Patient is afebrile.





The abdomen is gravid and nontender.


Extremities show no clubbing or cyanosis.  There is fairly significant pedal 

edema.  DTRs are 3+ to 4 over 4 globally


Pelvic exam is pending





Fetal monitor shows frequent accelerations with better than average beat-to-

beat variability.  Patient has had 2-3 episodes of fetal bradycardia.  

Contractions have varied from every 2-3 minutes to now every 5-8 minutes.





Laboratory Tests


18 10:35








18 05:45








Assessment and plan   36 and now 5/7 weeks' gestation with  labor a GBS 

positive culture suspicious fetal heart rate tracing suspicion for a LGA baby.  

Plan is for amniotomy and Pitocin augmentation of labor.  We will continue the 

antibiotics until delivery.  Patient will be allowed an epidural.  Patient 

understands that there is increased potential for need for  delivery.





2018





This patient is without complaint.  She is ambulating, voiding, tolerating oral 

intake well, has good pain control.  Patient denies chest pain, denies 

shortness of breath, denies nausea vomiting, and denies headache








Vital Signs








 18





 15:45 04:50


 


Temp  97.2


 


Pulse  87


 


Resp  18


 


B/P (MAP)  110/65 (80)


 


Pulse Ox  98


 


O2 Delivery  Room Air


 


O2 Flow Rate 15.00 





Vital signs are stable.  Patient is afebrile.





Fundus is firm below the umbilicus and nontender.


Extremities show clubbing cyanosis.  There is no Homans sign.





Assessment and plan   post partum day number 1 status post  spontaneous 

vaginal delivery doing well.  Plan is for routine convalescence care today and 

likely discharge home tomorrow





2018





See discharge summary from this date.


Final Diagnosis


36-5/7 weeks operative vaginal delivery











FERNANDA WALTER MD Dec 9, 2018 09:11

## 2019-06-14 ENCOUNTER — HOSPITAL ENCOUNTER (OUTPATIENT)
Dept: HOSPITAL 75 - RAD | Age: 39
End: 2019-06-14
Attending: OBSTETRICS & GYNECOLOGY
Payer: COMMERCIAL

## 2019-06-14 DIAGNOSIS — Z12.31: Primary | ICD-10-CM

## 2019-06-14 PROCEDURE — 77067 SCR MAMMO BI INCL CAD: CPT

## 2019-06-14 NOTE — DIAGNOSTIC IMAGING REPORT
EXAMINATION: Digital mammogram bilateral screening.



The current study was also evaluated with a Computer Aided

Detection (CAD) system. 3-D tomosynthesis was also performed and

reviewed.



INDICATION: Screening.



This is the patient's baseline study. At this time, there are no

current complaints.



FINDINGS: The fibroglandular tissue in both breasts is dense.

This does limit the sensitivity of this exam. There is no primary

or secondary sign of malignancy noted.



IMPRESSION:

1. There is no evidence for malignancy.

2. The patient should have her annual bilateral screening

mammogram on schedule in June of 2020.



ACR BI-RADS Category 1: Negative.

Result letter will be mailed to the patient.

Note:  At least 10% of breast cancer is not imaged by

mammography.



Dictated by: 



  Dictated on workstation # [id number]

## 2020-08-07 ENCOUNTER — HOSPITAL ENCOUNTER (OUTPATIENT)
Dept: HOSPITAL 75 - RAD | Age: 40
End: 2020-08-07
Attending: OBSTETRICS & GYNECOLOGY
Payer: COMMERCIAL

## 2020-08-07 DIAGNOSIS — Z12.31: Primary | ICD-10-CM

## 2020-08-07 PROCEDURE — 77067 SCR MAMMO BI INCL CAD: CPT

## 2020-08-07 PROCEDURE — 77063 BREAST TOMOSYNTHESIS BI: CPT

## 2020-08-07 NOTE — DIAGNOSTIC IMAGING REPORT
INDICATION: Routine screening.



Comparison is made with prior mammogram from 06/14/2019.



2-D and 3-D bilateral screening mammography was performed with

CAD.



Both breasts remain heterogeneously dense, limiting the

sensitivity of mammography. The parenchymal pattern is stable. No

mass or malignant appearing microcalcifications are seen. Axillae

are unremarkable.



IMPRESSION: BI-RADS Category 1



No mammographic features suspicious for malignancy are

identified.



ACR BI-RADS Category 1: Negative.

Result letter will be mailed to the patient.

Note:  At least 10% of breast cancer is not imaged by

mammography.



Dictated by: 



  Dictated on workstation # [id number]

## 2021-09-23 ENCOUNTER — HOSPITAL ENCOUNTER (OUTPATIENT)
Dept: HOSPITAL 75 - RAD | Age: 41
End: 2021-09-23
Attending: OBSTETRICS & GYNECOLOGY
Payer: COMMERCIAL

## 2021-09-23 DIAGNOSIS — Z12.31: Primary | ICD-10-CM

## 2021-09-23 PROCEDURE — 77067 SCR MAMMO BI INCL CAD: CPT

## 2021-09-23 PROCEDURE — 77063 BREAST TOMOSYNTHESIS BI: CPT

## 2021-09-24 NOTE — DIAGNOSTIC IMAGING REPORT
Digital mammogram 



INDICATION: Bilateral screening



This study was compared to the prior exams of 8/7/2020 and

6/14/2019. 



At this time there are no current complaints. 



The current study was also evaluated with a Computer Aided

Detection (CAD) system.



FINDINGS: The fibroglandular tissue in both breasts is dense.

This does limit the sensitivity of this exam. Overall, there does

not appear to have been any significant change when compared to

the prior study. No primary or secondary sign of malignancy is

noted.



IMPRESSION: There is no radiographic evidence for malignancy.



 ACR category 1 



ACR BI-RADS Category 1: Negative.

Result letter will be mailed to the patient.

Note:  At least 10% of breast cancer is not imaged by

mammography.



Dictated by: 



  Dictated on workstation # [id number]

## 2022-11-14 ENCOUNTER — HOSPITAL ENCOUNTER (OUTPATIENT)
Dept: HOSPITAL 75 - RAD | Age: 42
End: 2022-11-14
Attending: OBSTETRICS & GYNECOLOGY
Payer: COMMERCIAL

## 2022-11-14 DIAGNOSIS — Z12.31: Primary | ICD-10-CM

## 2022-11-14 PROCEDURE — 77067 SCR MAMMO BI INCL CAD: CPT

## 2022-11-14 PROCEDURE — 77063 BREAST TOMOSYNTHESIS BI: CPT

## 2022-11-14 NOTE — DIAGNOSTIC IMAGING REPORT
Indication: Routine screening.



Comparison is made prior mammogram from 9/23/2021 and 8/7/2020.



2-D and 3-D bilateral screening mammography was performed with

CAD.



CAD is utilized.



The current study was also evaluated with a Computer Aided

Detection (CAD) system.



Both breasts are heterogeneously dense, limiting the sensitivity

of mammography. The parenchymal pattern is stable. No mass or

malignant-appearing microcalcifications are seen. Axillae are

unremarkable.



IMPRESSION: BI-RADS Category 1



No mammographic features suspicious for malignancy are

identified.



ACR BI-RADS Category 1: Negative.

Result letter will be mailed to the patient.

Note:  At least 10% of breast cancer is not imaged by

mammography.



Dictated by: 



  Dictated on workstation # [id number]